# Patient Record
Sex: FEMALE | Race: WHITE | Employment: OTHER | URBAN - METROPOLITAN AREA
[De-identification: names, ages, dates, MRNs, and addresses within clinical notes are randomized per-mention and may not be internally consistent; named-entity substitution may affect disease eponyms.]

---

## 2017-05-23 ENCOUNTER — HOSPITAL ENCOUNTER (OUTPATIENT)
Dept: ULTRASOUND IMAGING | Age: 62
Discharge: HOME | End: 2017-05-23
Attending: NURSE PRACTITIONER
Payer: OTHER GOVERNMENT

## 2017-05-23 DIAGNOSIS — B18.2 CHRONIC HEPATITIS C WITH HEPATIC COMA (HCC): ICD-10-CM

## 2017-05-23 PROCEDURE — 0346T US ABD LTD W ELASTOGRAPHY: CPT

## 2017-05-23 PROCEDURE — 0346T US ELASTOGRAPHY: CPT

## 2019-05-13 PROBLEM — D69.6 THROMBOCYTOPENIA (HCC): Status: ACTIVE | Noted: 2019-05-13

## 2019-05-13 PROBLEM — D89.1 CRYOGLOBULINEMIC VASCULITIS (HCC): Status: ACTIVE | Noted: 2019-05-13

## 2019-05-13 NOTE — PROGRESS NOTES
[place] [id number], [address]  [place], [address]  Office Phone: [phone]  Fax: [phone]      Reason for visit:  No chief complaint on file. HPI:   Ty Gallo is a 66 y.o.  female history of hepatitis C (genotype 1a) was treated with harvoni Who I was asked to see in consultation at the request of Dr. Deirdre Jaffe for evaluation for thrombocytopenia with cryoglobulin vasculitis    DX: thrombocytopenia with cryoglobulinemic vasculitis    Past medical history:HepC treated, cryoglobulinemia  Past surgical history:Knees replacement, TAHBSO, Kidney biopsy  Family history:Not significant  Allergies:NKDA    Social History     Socioeconomic History    Marital status:      Spouse name: Not on file    Number of children: Not on file    Years of education: Not on file    Highest education level: Not on file       Review of Systems  All 12 points of review of system are negative except for tingling in fingers and feet chronically    Objective:  Physical Exam:  Visit Vitals  /75 (BP 1 Location: Right arm, BP Patient Position: Sitting)   Pulse 68   Temp 96.9 °F (36.1 °C) (Oral)   Resp 18   Wt 109.3 kg (241 lb)         General:  Alert, cooperative, no distress, appears stated age. Head:  Normocephalic, without obvious abnormality, atraumatic. Eyes:  Conjunctivae/corneas clear. PERRL, EOMs intact. Throat: Lips, mucosa, and tongue normal.    Neck: Supple, symmetrical, trachea midline, no adenopathy, thyroid: no enlargement/tenderness/nodules   Back:   Symmetric, no curvature. ROM normal. No CVA tenderness. Lungs:   Clear to auscultation bilaterally. Chest wall:  No tenderness or deformity. Heart:  Regular rate and rhythm, S1, S2 normal, no murmur, click, rub or gallop. Abdomen:   Soft, non-tender. Bowel sounds normal. No masses,  No organomegaly.    Extremities: Tips of fingers small wounds from vasculitis   Skin: Skin color, texture, turgor normal. No rashes or lesions. Lymph nodes: Cervical, supraclavicular, and axillary nodes normal.   Neurologic: CNII-XII intact. Diagnostic Imaging     No results found for this or any previous visit. Lab Results  No results found for: WBC, HGB, HGBP, HCT, PHCT, RBCH, PLT, MCV, HGBEXT, HCTEXT, PLTEXT    No results found for: NA, K, CL, CO2, AGAP, GLU, BUN, CREA, BUCR, GFRAA, GFRNA, CA, TBIL, GPT, SGOT, AP, TP, ALB, GLOB, AGRAT, ALT    Assessment/Plan:  66 y.o. female   1. Cryoglobulinemic vasculitis ([facility] [address]  She was given Rituxan by her rheumatologist Tadeo Munguia  Being treated with rituxan by rheumatologist    2. 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 Rituxan. resoved as of 4/30/19. On 04/30/2019, WBC 6.4, H&H 15/45.5, MCV 86, platelet 953. Recheck CBC and CMP    CBC came back and showed WBC 7.4, H&H 15.1/45.3, platelet 712. Patient has mild thrombocytopenia which could be either related to medication or immune thrombocytopenia (ITP). I will order platelet antibodies for next visit however there is no hematologic intervention needed for now unless platelet=<10,000 or patient bleeding. There are many cases of ITP related to cryoglobulinemic vasculitis. 3.  Hepatitis C: Patient says she was treated with Kathye Pass. Recheck hepatitis C serology. Thank you Raquel Villegas for allowing me to participate in Ms. Kraft's care      Return in 2weeks

## 2019-05-14 ENCOUNTER — HOSPITAL ENCOUNTER (OUTPATIENT)
Dept: INFUSION THERAPY | Age: 64
Discharge: HOME | End: 2019-05-14
Payer: OTHER GOVERNMENT

## 2019-05-14 ENCOUNTER — OFFICE VISIT (OUTPATIENT)
Dept: ONCOLOGY | Age: 64
End: 2019-05-14

## 2019-05-14 VITALS
DIASTOLIC BLOOD PRESSURE: 75 MMHG | TEMPERATURE: 96.9 F | RESPIRATION RATE: 18 BRPM | WEIGHT: 241 LBS | HEART RATE: 68 BPM | SYSTOLIC BLOOD PRESSURE: 148 MMHG

## 2019-05-14 VITALS — HEART RATE: 68 BPM | DIASTOLIC BLOOD PRESSURE: 75 MMHG | SYSTOLIC BLOOD PRESSURE: 148 MMHG | TEMPERATURE: 97 F

## 2019-05-14 DIAGNOSIS — D69.6 THROMBOCYTOPENIA (HCC): ICD-10-CM

## 2019-05-14 DIAGNOSIS — D89.1 CRYOGLOBULINEMIC VASCULITIS (HCC): Primary | ICD-10-CM

## 2019-05-14 DIAGNOSIS — D89.1 CRYOGLOBULINEMIC VASCULITIS (HCC): ICD-10-CM

## 2019-05-14 LAB
ALBUMIN SERPL-MCNC: 3.7 G/DL (ref 3.4–5)
ALBUMIN/GLOB SERPL: 0.9 {RATIO} (ref 0.8–1.7)
ALP SERPL-CCNC: 46 U/L (ref 45–117)
ALT SERPL-CCNC: 29 U/L (ref 13–56)
ANION GAP SERPL CALC-SCNC: 8 MMOL/L (ref 3–18)
AST SERPL-CCNC: 21 U/L (ref 15–37)
BASO+EOS+MONOS # BLD AUTO: 0.7 K/UL (ref 0–2.3)
BASO+EOS+MONOS NFR BLD AUTO: 10 % (ref 0.1–17)
BILIRUB SERPL-MCNC: 0.5 MG/DL (ref 0.2–1)
BUN SERPL-MCNC: 20 MG/DL (ref 7–18)
BUN/CREAT SERPL: 17 (ref 12–20)
CALCIUM SERPL-MCNC: 9 MG/DL (ref 8.5–10.1)
CHLORIDE SERPL-SCNC: 106 MMOL/L (ref 100–108)
CO2 SERPL-SCNC: 25 MMOL/L (ref 21–32)
CREAT SERPL-MCNC: 1.17 MG/DL (ref 0.6–1.3)
DIFFERENTIAL METHOD BLD: ABNORMAL
ERYTHROCYTE [DISTWIDTH] IN BLOOD BY AUTOMATED COUNT: 15.6 % (ref 11.5–14.5)
GLOBULIN SER CALC-MCNC: 3.9 G/DL (ref 2–4)
GLUCOSE SERPL-MCNC: 118 MG/DL (ref 74–99)
HCT VFR BLD AUTO: 45.3 % (ref 36–48)
HGB BLD-MCNC: 15.1 G/DL (ref 12–16)
LYMPHOCYTES # BLD: 0.7 K/UL (ref 1.1–5.9)
LYMPHOCYTES NFR BLD: 10 % (ref 14–44)
MCH RBC QN AUTO: 29 PG (ref 25–35)
MCHC RBC AUTO-ENTMCNC: 33.3 G/DL (ref 31–37)
MCV RBC AUTO: 86.9 FL (ref 78–102)
NEUTS SEG # BLD: 6 K/UL (ref 1.8–9.5)
NEUTS SEG NFR BLD: 81 % (ref 40–70)
PLATELET # BLD AUTO: 129 K/UL (ref 140–440)
POTASSIUM SERPL-SCNC: 4.1 MMOL/L (ref 3.5–5.5)
PROT SERPL-MCNC: 7.6 G/DL (ref 6.4–8.2)
RBC # BLD AUTO: 5.21 M/UL (ref 4.1–5.1)
SODIUM SERPL-SCNC: 139 MMOL/L (ref 136–145)
WBC # BLD AUTO: 7.4 K/UL (ref 4.5–13)

## 2019-05-14 PROCEDURE — 85025 COMPLETE CBC W/AUTO DIFF WBC: CPT

## 2019-05-14 PROCEDURE — 80053 COMPREHEN METABOLIC PANEL: CPT

## 2019-05-14 PROCEDURE — 36415 COLL VENOUS BLD VENIPUNCTURE: CPT

## 2019-05-14 RX ORDER — HYDROCODONE BITARTRATE AND ACETAMINOPHEN 5; 325 MG/1; MG/1
TABLET ORAL
Refills: 0 | COMMUNITY
Start: 2019-04-24

## 2019-05-14 RX ORDER — ERGOCALCIFEROL 1.25 MG/1
CAPSULE ORAL
COMMUNITY
Start: 2019-03-08

## 2019-05-14 RX ORDER — SILDENAFIL CITRATE 20 MG/1
TABLET ORAL
COMMUNITY
Start: 2019-03-05 | End: 2019-12-27

## 2019-05-14 RX ORDER — PREDNISONE 5 MG/1
TABLET ORAL
COMMUNITY
Start: 2019-05-13 | End: 2019-12-27

## 2019-05-14 RX ORDER — IPRATROPIUM BROMIDE AND ALBUTEROL 20; 100 UG/1; UG/1
SPRAY, METERED RESPIRATORY (INHALATION)
COMMUNITY
Start: 2019-03-24

## 2019-05-14 RX ORDER — NITROFURANTOIN 25; 75 MG/1; MG/1
CAPSULE ORAL
Refills: 0 | COMMUNITY
Start: 2019-04-09 | End: 2019-08-29 | Stop reason: ALTCHOICE

## 2019-05-14 RX ORDER — SULFAMETHOXAZOLE AND TRIMETHOPRIM 800; 160 MG/1; MG/1
TABLET ORAL
Refills: 0 | COMMUNITY
Start: 2019-05-01 | End: 2019-05-29 | Stop reason: ALTCHOICE

## 2019-05-14 RX ORDER — SERTRALINE HYDROCHLORIDE 50 MG/1
100 TABLET, FILM COATED ORAL DAILY
COMMUNITY
Start: 2019-05-11 | End: 2019-12-27

## 2019-05-14 RX ORDER — AMLODIPINE BESYLATE 10 MG/1
TABLET ORAL
COMMUNITY
Start: 2019-02-09

## 2019-05-14 RX ORDER — TRAZODONE HYDROCHLORIDE 50 MG/1
TABLET ORAL
COMMUNITY
Start: 2019-04-04

## 2019-05-14 RX ORDER — DIPHENOXYLATE HYDROCHLORIDE AND ATROPINE SULFATE 2.5; .025 MG/1; MG/1
TABLET ORAL
Refills: 0 | COMMUNITY
Start: 2019-04-29

## 2019-05-14 RX ORDER — CLINDAMYCIN HYDROCHLORIDE 150 MG/1
CAPSULE ORAL
Refills: 0 | COMMUNITY
Start: 2019-04-10 | End: 2019-05-29 | Stop reason: ALTCHOICE

## 2019-05-14 RX ORDER — NIFEDIPINE 60 MG/1
TABLET, EXTENDED RELEASE ORAL
COMMUNITY
Start: 2019-03-05

## 2019-05-14 RX ORDER — PANTOPRAZOLE SODIUM 40 MG/1
TABLET, DELAYED RELEASE ORAL
COMMUNITY
Start: 2019-02-17

## 2019-05-14 NOTE — Clinical Note
Please call patient and let her know that her platelets are 482. Only mildly decreased. 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 No intervention needed at this point. Call us if any non stopping nose bleeding, BRBPR or melena or sudden large bruises. Thx

## 2019-05-29 ENCOUNTER — OFFICE VISIT (OUTPATIENT)
Dept: ONCOLOGY | Age: 64
End: 2019-05-29

## 2019-05-29 VITALS
OXYGEN SATURATION: 95 % | DIASTOLIC BLOOD PRESSURE: 79 MMHG | SYSTOLIC BLOOD PRESSURE: 128 MMHG | HEART RATE: 71 BPM | TEMPERATURE: 96.8 F | RESPIRATION RATE: 18 BRPM | WEIGHT: 244 LBS

## 2019-05-29 DIAGNOSIS — D69.6 THROMBOCYTOPENIA (HCC): Primary | ICD-10-CM

## 2019-05-29 NOTE — PROGRESS NOTES
Charlie Wong is a 60 y.o. female presenting today for a follow-up r/t thrombocytopenia. Patient is ambulatory with no assistive devices and complains of pain in lower back and index finger left hand 7/10. Chief Complaint   Patient presents with    Thrombocytopenia     follow up     Visit Vitals  /79 (BP 1 Location: Right arm, BP Patient Position: Sitting)   Pulse 71   Temp 96.8 °F (36 °C) (Oral)   Resp 18   Wt 110.7 kg (244 lb)   SpO2 95%       Current Outpatient Medications   Medication Sig    diphenoxylate-atropine (LOMOTIL) 2.5-0.025 mg per tablet     ergocalciferol (ERGOCALCIFEROL) 50,000 unit capsule     NIFEdipine ER (ADALAT CC) 60 mg ER tablet     pantoprazole (PROTONIX) 40 mg tablet     predniSONE (DELTASONE) 5 mg tablet     sertraline (ZOLOFT) 50 mg tablet Take 100 mg by mouth daily.  traZODone (DESYREL) 50 mg tablet     Cetirizine (ZYRTEC) 10 mg cap Take  by mouth.  amLODIPine (NORVASC) 10 mg tablet     HYDROcodone-acetaminophen (NORCO) 5-325 mg per tablet take 1 to 2 tablets by mouth every 6 hours if needed for pain    COMBIVENT RESPIMAT  mcg/actuation inhaler     nitrofurantoin, macrocrystal-monohydrate, (MACROBID) 100 mg capsule take 1 capsule by mouth twice a day with food for 7 days    sildenafil, antihypertensive, (REVATIO) 20 mg tablet      No current facility-administered medications for this visit. Medications no longer taking/discontinued: zyrtec, norco, macrobid, revatio    Fall Risk Assessment, last 12 mths 5/29/2019   Able to walk? Yes   Fall in past 12 months? No       3 most recent PHQ Screens 5/29/2019   Little interest or pleasure in doing things Several days   Feeling down, depressed, irritable, or hopeless Several days   Total Score PHQ 2 2         1. Have you been to the ER, urgent care clinic since your last visit? Hospitalized since your last visit?no    2.  Have you seen or consulted any other health care providers outside of the [facility] since your last visit? Include any pap smears or colon screening.  no

## 2019-05-29 NOTE — PROGRESS NOTES
Hematology/Oncology  Progress Note    Name: Girish Silverio  Date: 2019  : 1955    Christiano Tamez MD     Ms. Cynthia Hollis is a 60y.o. year old female who was seen for Thrombocytopenia. Subjective:     Ms. Cynthia Hollis is a 60y.o. year old female with a past medical history of Hep C, cryoglobulinemic vasculitis, and thrombocytopenia. She is currently under the care of a rheumatologist for her cryoglobulinemia, she is being treated with Rituxan She is here today for follow up. She denies unexplained bruising/bleeding. Today she does not have any new physical concerns or complaints to report. Past medical history, family history, and social history: these were reviewed and remains unchanged. History reviewed. No pertinent past medical history. History reviewed. No pertinent surgical history.   Social History     Socioeconomic History    Marital status:      Spouse name: Not on file    Number of children: Not on file    Years of education: Not on file    Highest education level: Not on file   Occupational History    Not on file   Social Needs    Financial resource strain: Not on file    Food insecurity:     Worry: Not on file     Inability: Not on file    Transportation needs:     Medical: Not on file     Non-medical: Not on file   Tobacco Use    Smoking status: Never Smoker    Smokeless tobacco: Never Used   Substance and Sexual Activity    Alcohol use: Never     Frequency: Never    Drug use: Not on file    Sexual activity: Not on file   Lifestyle    Physical activity:     Days per week: Not on file     Minutes per session: Not on file    Stress: Not on file   Relationships    Social connections:     Talks on phone: Not on file     Gets together: Not on file     Attends [employer] service: Not on file     Active member of club or organization: Not on file     Attends meetings of clubs or organizations: Not on file     Relationship status: Not on file    Intimate partner violence:     Fear of current or ex partner: Not on file     Emotionally abused: Not on file     Physically abused: Not on file     Forced sexual activity: Not on file   Other Topics Concern    Not on file   Social History Narrative    Not on file     History reviewed. No pertinent family history. Current Outpatient Medications   Medication Sig Dispense Refill    diphenoxylate-atropine (LOMOTIL) 2.5-0.025 mg per tablet   0    ergocalciferol (ERGOCALCIFEROL) 50,000 unit capsule       NIFEdipine ER (ADALAT CC) 60 mg ER tablet       pantoprazole (PROTONIX) 40 mg tablet       predniSONE (DELTASONE) 5 mg tablet       sertraline (ZOLOFT) 50 mg tablet Take 100 mg by mouth daily.  traZODone (DESYREL) 50 mg tablet       Cetirizine (ZYRTEC) 10 mg cap Take  by mouth.  amLODIPine (NORVASC) 10 mg tablet       HYDROcodone-acetaminophen (NORCO) 5-325 mg per tablet take 1 to 2 tablets by mouth every 6 hours if needed for pain  0    COMBIVENT RESPIMAT  mcg/actuation inhaler       nitrofurantoin, macrocrystal-monohydrate, (MACROBID) 100 mg capsule take 1 capsule by mouth twice a day with food for 7 days  0    sildenafil, antihypertensive, (REVATIO) 20 mg tablet          Review of Systems  Constitutional: The patient has no acute distress or discomfort. HEENT: The patient denies recent head trauma, eye pain, blurred vision,  hearing deficit, oropharyngeal mucosal pain or lesions, and the patient denies throat pain or discomfort. Lymphatics: The patient denies palpable peripheral lymphadenopathy. Hematologic: The patient denies having bruising, bleeding, or progressive fatigue. Respiratory: Patient denies having shortness of breath, cough, sputum production, fever, or dyspnea on exertion. Cardiovascular: The patient denies having leg pain, leg swelling, heart palpitations, chest permit, chest pain, or lightheadedness. The patient denies having dyspnea on exertion.   Gastrointestinal: The patient denies having nausea, emesis, or diarrhea. The patient denies having any hematemesis or blood in the stool. Genitourinary: Patient denies having urinary urgency, frequency, or dysuria. The patient denies having blood in the urine. Psychological: The patient denies having symptoms of nervousness, anxiety, depression, or thoughts of harming self. Skin: Patient denies having skin rashes, skin, ulcerations, or unexplained itching or pruritus. Musculoskeletal: The patient denies having pain in the joints or bones. Objective:     Visit Vitals  Blood Pressure 128/79 (BP 1 Location: Right arm, BP Patient Position: Sitting)   Pulse 71   Temperature 96.8 °F (36 °C) (Oral)   Respiration 18   Weight 110.7 kg (244 lb)   Oxygen Saturation 95%      PS 0/10  Physical Exam:     Constitutional Alert, cooperative, oriented. Mood and affect appropriate. Well nourished. Well developed. Head Normocephalic; no scars   Eyes Conjunctivae and sclerae are clear and without icterus. Pupils are reactive and equal.   ENMT Sinuses are nontender. No oral exudates, ulcers, masses, thrush or mucositis. Oropharynx clear. Tongue normal.   Neck Supple without masses or thyromegaly. No jugular venous distension. Hematologic/Lymphatic No petechiae or purpura. No tender or palpable lymph nodes in the cervical, supraclavicular, axillary or inguinal area. Respiratory Lungs are clear to auscultation without rhonchi or wheezing. Cardiovascular Regular rate and rhythm of heart without murmurs, gallops or rubs. Chest / Line Site Chest is symmetric with no chest wall deformities. Abdomen Non-tender, non-distended, no masses, ascites or hepatosplenomegaly. Good bowel sounds. No guarding or rebound tenderness. No pulsatile masses. Musculoskeletal No tenderness or swelling, normal range of motion without obvious weakness. Extremities No visible deformities, no cyanosis, clubbing or edema.     Skin No rashes, scars, or lesions suggestive of malignancy. No petechiae, purpura, or ecchymoses. No excoriations. Neurologic No sensory or motor deficits, normal cerebellar function, normal gait, cranial nerves intact. Psychiatric Alert and oriented times three. Coherent speech. There is no clinical evidence of anxiety, depression, or melancholy. Verbalizes understanding of our discussions today. Lab data:      Results for orders placed or performed during the hospital encounter of 05/14/19   CBC WITH 3 PART DIFF     Status: Abnormal   Result Value Ref Range Status    WBC 7.4 4.5 - 13.0 K/uL Final    RBC 5.21 (H) 4.10 - 5.10 M/uL Final    HGB 15.1 12.0 - 16.0 g/dL Final    HCT 45.3 36 - 48 % Final    MCV 86.9 78 - 102 FL Final    MCH 29.0 25.0 - 35.0 PG Final    MCHC 33.3 31 - 37 g/dL Final    RDW 15.6 (H) 11.5 - 14.5 % Final    PLATELET 098 (L) 357 - 440 K/uL Final    NEUTROPHILS 81 (H) 40 - 70 % Final    MIXED CELLS 10 0.1 - 17 % Final    LYMPHOCYTES 10 (L) 14 - 44 % Final    ABS. NEUTROPHILS 6.0 1.8 - 9.5 K/UL Final    ABS. MIXED CELLS 0.7 0.0 - 2.3 K/uL Final    ABS. LYMPHOCYTES 0.7 (L) 1.1 - 5.9 K/UL Final     Comment: Test performed at [address] [place] [address] or Outpatient [facility]fusion [facility] Location. Reviewed by Medical Director. DF AUTOMATED   Final         Assessment / Plan     1. Thrombocytopenia ([address]      I have explained to the patient that her thrombocytopenia is mild. No interventions are warranted at this time. The CBC from 5/14/2019 showed a platelet count of 467,127. Platelet AB was ordered, but not done. Patient will repeat labs; CBC, CMP, and platelet AB prior to f/u visit in 3 months. We will monitor CBC every 3 months, if the platelets fall below 10,000 the patient will be treated with IVIG or high dose steroid.           Orders Placed This Encounter    CBC WITH AUTOMATED DIFF     Standing Status:   Future     Standing Expiration Date:   5/69/8347    METABOLIC PANEL, COMPREHENSIVE     Standing Status: Future     Standing Expiration Date:   5/29/2020    PLATELET AB, DIRECT     Standing Status:   Future     Standing Expiration Date:   5/29/2020     F/U 3 months or sooner if indicated.      Nadia Castillo NP  5/29/2019

## 2019-08-26 ENCOUNTER — HOSPITAL ENCOUNTER (OUTPATIENT)
Dept: INFUSION THERAPY | Age: 64
Discharge: HOME | End: 2019-08-26
Payer: OTHER GOVERNMENT

## 2019-08-26 VITALS
HEART RATE: 66 BPM | SYSTOLIC BLOOD PRESSURE: 148 MMHG | DIASTOLIC BLOOD PRESSURE: 70 MMHG | TEMPERATURE: 97 F | OXYGEN SATURATION: 98 %

## 2019-08-26 DIAGNOSIS — D69.6 THROMBOCYTOPENIA (HCC): ICD-10-CM

## 2019-08-26 LAB
ALBUMIN SERPL-MCNC: 3.5 G/DL (ref 3.4–5)
ALBUMIN/GLOB SERPL: 0.9 {RATIO} (ref 0.8–1.7)
ALP SERPL-CCNC: 70 U/L (ref 45–117)
ALT SERPL-CCNC: 23 U/L (ref 13–56)
ANION GAP SERPL CALC-SCNC: 4 MMOL/L (ref 3–18)
AST SERPL-CCNC: 22 U/L (ref 10–38)
BASOPHILS # BLD: 0 K/UL (ref 0–0.1)
BASOPHILS NFR BLD: 1 % (ref 0–2)
BILIRUB SERPL-MCNC: 0.5 MG/DL (ref 0.2–1)
BUN SERPL-MCNC: 18 MG/DL (ref 7–18)
BUN/CREAT SERPL: 17 (ref 12–20)
CALCIUM SERPL-MCNC: 8.8 MG/DL (ref 8.5–10.1)
CHLORIDE SERPL-SCNC: 104 MMOL/L (ref 100–111)
CO2 SERPL-SCNC: 28 MMOL/L (ref 21–32)
CREAT SERPL-MCNC: 1.05 MG/DL (ref 0.6–1.3)
DIFFERENTIAL METHOD BLD: ABNORMAL
EOSINOPHIL # BLD: 0.1 K/UL (ref 0–0.4)
EOSINOPHIL NFR BLD: 2 % (ref 0–5)
ERYTHROCYTE [DISTWIDTH] IN BLOOD BY AUTOMATED COUNT: 14.8 % (ref 11.6–14.5)
GLOBULIN SER CALC-MCNC: 4 G/DL (ref 2–4)
GLUCOSE SERPL-MCNC: 89 MG/DL (ref 74–99)
HCT VFR BLD AUTO: 41.5 % (ref 35–45)
HGB BLD-MCNC: 13.7 G/DL (ref 12–16)
LYMPHOCYTES # BLD: 0.6 K/UL (ref 0.9–3.6)
LYMPHOCYTES NFR BLD: 14 % (ref 21–52)
MCH RBC QN AUTO: 27 PG (ref 24–34)
MCHC RBC AUTO-ENTMCNC: 33 G/DL (ref 31–37)
MCV RBC AUTO: 81.9 FL (ref 74–97)
MONOCYTES # BLD: 0.4 K/UL (ref 0.05–1.2)
MONOCYTES NFR BLD: 9 % (ref 3–10)
NEUTS SEG # BLD: 3.1 K/UL (ref 1.8–8)
NEUTS SEG NFR BLD: 74 % (ref 40–73)
PLATELET # BLD AUTO: 120 K/UL (ref 135–420)
PMV BLD AUTO: 11.3 FL (ref 9.2–11.8)
POTASSIUM SERPL-SCNC: 4.3 MMOL/L (ref 3.5–5.5)
PROT SERPL-MCNC: 7.5 G/DL (ref 6.4–8.2)
RBC # BLD AUTO: 5.07 M/UL (ref 4.2–5.3)
SODIUM SERPL-SCNC: 136 MMOL/L (ref 136–145)
WBC # BLD AUTO: 4.2 K/UL (ref 4.6–13.2)

## 2019-08-26 PROCEDURE — 36415 COLL VENOUS BLD VENIPUNCTURE: CPT

## 2019-08-26 PROCEDURE — 85025 COMPLETE CBC W/AUTO DIFF WBC: CPT

## 2019-08-26 PROCEDURE — 86022 PLATELET ANTIBODIES: CPT

## 2019-08-26 PROCEDURE — 80053 COMPREHEN METABOLIC PANEL: CPT

## 2019-08-26 NOTE — PROGRESS NOTES
[facility] Progress Note    Date: 2019    Name: Emre Bautista    MRN: [id number]         : 1955    Peripheral Lab Draw      Ms. Krfat to [facility], ambulatory at [address] [place] [address] accompanied by self. Pt was assessed and education was provided. Ms. Kraft's vitals were reviewed and patient was observed for 5 minutes prior to treatment. Visit Vitals  /70 (BP 1 Location: Right arm, BP Patient Position: Sitting)   Pulse 66   Temp 97 °F (36.1 °C) (Oral)   SpO2 98%     Recent Results (from the past 12 hour(s))   METABOLIC PANEL, COMPREHENSIVE    Collection Time: 19 11:42 AM   Result Value Ref Range    Sodium 136 136 - 145 mmol/L    Potassium 4.3 3.5 - 5.5 mmol/L    Chloride 104 100 - 111 mmol/L    CO2 28 21 - 32 mmol/L    Anion gap 4 3.0 - 18 mmol/L    Glucose 89 74 - 99 mg/dL    BUN 18 7.0 - 18 MG/DL    Creatinine 1.05 0.6 - 1.3 MG/DL    BUN/Creatinine ratio 17 12 - 20      GFR est AA >60 >60 ml/min/1.73m2    GFR est non-AA 53 (L) >60 ml/min/1.73m2    Calcium 8.8 8.5 - 10.1 MG/DL    Bilirubin, total 0.5 0.2 - 1.0 MG/DL    ALT (SGPT) 23 13 - 56 U/L    AST (SGOT) 22 10 - 38 U/L    Alk. phosphatase 70 45 - 117 U/L    Protein, total 7.5 6.4 - 8.2 g/dL    Albumin 3.5 3.4 - 5.0 g/dL    Globulin 4.0 2.0 - 4.0 g/dL    A-G Ratio 0.9 0.8 - 1.7     CBC WITH AUTOMATED DIFF    Collection Time: 19 11:42 AM   Result Value Ref Range    WBC 4.2 (L) 4.6 - 13.2 K/uL    RBC 5.07 4.20 - 5.30 M/uL    HGB 13.7 12.0 - 16.0 g/dL    HCT 41.5 35.0 - 45.0 %    MCV 81.9 74.0 - 97.0 FL    MCH 27.0 24.0 - 34.0 PG    MCHC 33.0 31.0 - 37.0 g/dL    RDW 14.8 (H) 11.6 - 14.5 %    PLATELET 753 (L) 382 - 420 K/uL    MPV 11.3 9.2 - 11.8 FL    NEUTROPHILS 74 (H) 40 - 73 %    LYMPHOCYTES 14 (L) 21 - 52 %    MONOCYTES 9 3 - 10 %    EOSINOPHILS 2 0 - 5 %    BASOPHILS 1 0 - 2 %    ABS. NEUTROPHILS 3.1 1.8 - 8.0 K/UL    ABS. LYMPHOCYTES 0.6 (L) 0.9 - 3.6 K/UL    ABS. MONOCYTES 0.4 0.05 - 1.2 K/UL    ABS. EOSINOPHILS 0.1 0.0 - 0.4 K/UL    ABS. BASOPHILS 0.0 0.0 - 0.1 K/UL    DF AUTOMATED         Blood obtained peripherally from lft arm x 1 attempt with butterfly needle and sent to lab for Cbc w/diff, Platelet AB Direct and Cmp per written orders. No bleeding or hematoma noted at site. Guaze and coban applied. Ms. Altagracia Haines tolerated the phlebotomy, and had no complaints. Patient armband removed and shredded. Ms. Altagracia Haines was discharged from [place] [address] in stable condition at 1145.      Tyrell Marquez Phlebotomist PCT  August 26, 2019  4:27 PM

## 2019-08-28 LAB
PLAT GP IA/IIA IGG BLD QL IA: POSITIVE
PLAT GP IB/IX IGG BLD QL IA: POSITIVE
PLAT GP IIB/IIIA IGG BLD QL IA: POSITIVE

## 2019-08-29 ENCOUNTER — OFFICE VISIT (OUTPATIENT)
Dept: ONCOLOGY | Age: 64
End: 2019-08-29

## 2019-08-29 VITALS
TEMPERATURE: 96.8 F | WEIGHT: 243.4 LBS | SYSTOLIC BLOOD PRESSURE: 177 MMHG | OXYGEN SATURATION: 89 % | HEART RATE: 94 BPM | DIASTOLIC BLOOD PRESSURE: 81 MMHG | RESPIRATION RATE: 18 BRPM

## 2019-08-29 DIAGNOSIS — D69.6 THROMBOCYTOPENIA (HCC): Primary | ICD-10-CM

## 2019-08-29 DIAGNOSIS — R53.83 FATIGUE, UNSPECIFIED TYPE: ICD-10-CM

## 2019-08-29 RX ORDER — BUPROPION HYDROCHLORIDE 300 MG/1
TABLET ORAL
COMMUNITY
Start: 2019-08-12

## 2019-08-29 NOTE — PROGRESS NOTES
Dru Chew is a 60 y.o. 1955 female and presents with Follow-up (Thrombocytopenia and cryoglobulinemia)    Name: Dru Chew  Date: 2019  : 1955     Naman Monsalve MD      Ms. Renae Deng is a 60y.o. year old female who was seen for Thrombocytopenia.        Subjective:      Ms. Renae Deng is a 60y.o. year old female with a past medical history of Hep C, cryoglobulinemic vasculitis, and thrombocytopenia. She is currently under the care of a rheumatologist for her cryoglobulinemia, she was treated with Rituxan She is here today for follow up. She denies unexplained bruising/bleeding. Labs on 2019 showed WBC 4.2, H&H 13.7/41.5, platelet 033. Patient had positive antibody with platelet associated anti-IIb/IIIa, positive anti-Ib/IX, and anti-Ia/IIa. BUN and creatinine normal at 18 and 1.05. On review of systems today she complains of 10/10 chronic back pain, and easy fatigue, occasional nausea as well as diarrhea since she started Wellbutrin. Denies any fevers, chills, shortness of breath, abdominal pain. No melena or bright red blood per rectum. No focal neurologic deficit. ECOG performance status 0. Independent with ADLs and IADLs.    Past medical history, family history, and social history: these were reviewed and remains unchanged. Social History     Socioeconomic History    Marital status:      Spouse name: Not on file    Number of children: Not on file    Years of education: Not on file    Highest education level: Not on file   Tobacco Use    Smoking status: Never Smoker    Smokeless tobacco: Never Used   Substance and Sexual Activity    Alcohol use: Never     Frequency: Never    Drug use: Never    Sexual activity: Not Currently     History reviewed. No pertinent family history.     Current Outpatient Medications   Medication Sig Dispense Refill    buPROPion XL (WELLBUTRIN XL) 300 mg XL tablet       amLODIPine (NORVASC) 10 mg tablet       diphenoxylate-atropine (LOMOTIL) 2.5-0.025 mg per tablet   0    ergocalciferol (ERGOCALCIFEROL) 50,000 unit capsule       COMBIVENT RESPIMAT  mcg/actuation inhaler       NIFEdipine ER (ADALAT CC) 60 mg ER tablet       pantoprazole (PROTONIX) 40 mg tablet       predniSONE (DELTASONE) 5 mg tablet       traZODone (DESYREL) 50 mg tablet       HYDROcodone-acetaminophen (NORCO) 5-325 mg per tablet take 1 to 2 tablets by mouth every 6 hours if needed for pain  0    sertraline (ZOLOFT) 50 mg tablet Take 100 mg by mouth daily.  sildenafil, antihypertensive, (REVATIO) 20 mg tablet          No Known Allergies    Review of Systems    A 12 points  comprehensive review of systems was negative except for: chronic back pain, nausea diarrhea. Objective:  Visit Vitals  /81   Pulse 94   Temp 96.8 °F (36 °C) (Oral)   Resp 18   Wt 110.4 kg (243 lb 6.4 oz)   SpO2 (!) 89%         Physical Exam:   General appearance - alert, well appearing, and in no distress  Mental status - alert, oriented to person, place, and time  EYE-LANDY, EOMI  ENT-ENT exam normal, no neck nodes or sinus tenderness  Mouth - mucous membranes moist, pharynx normal without lesions  Neck - supple, no significant adenopathy   Chest - clear to auscultation, no wheezes, rales or rhonchi, symmetric air entry   Heart - normal rate and regular rhythm   Abdomen - soft, nontender, nondistended, no masses or organomegaly  Lymph- no adenopathy palpable  Ext-no pedal edema noted  Skin-Warm and dry. Neuro -alert, oriented, normal speech, no focal findings or movement disorder noted      Diagnostic Imaging     No results found for this or any previous visit. No results found for this or any previous visit. No results found for this or any previous visit.     Lab Results  Lab Results   Component Value Date/Time    WBC 4.2 (L) 08/26/2019 11:42 AM    HGB 13.7 08/26/2019 11:42 AM    HCT 41.5 08/26/2019 11:42 AM    PLATELET 670 (L) 24/39/6802 11:42 AM MCV 81.9 08/26/2019 11:42 AM       Lab Results   Component Value Date/Time    Sodium 136 08/26/2019 11:42 AM    Potassium 4.3 08/26/2019 11:42 AM    Chloride 104 08/26/2019 11:42 AM    CO2 28 08/26/2019 11:42 AM    Anion gap 4 08/26/2019 11:42 AM    Glucose 89 08/26/2019 11:42 AM    BUN 18 08/26/2019 11:42 AM    Creatinine 1.05 08/26/2019 11:42 AM    BUN/Creatinine ratio 17 08/26/2019 11:42 AM    GFR est AA >60 08/26/2019 11:42 AM    GFR est non-AA 53 (L) 08/26/2019 11:42 AM    Calcium 8.8 08/26/2019 11:42 AM    AST (SGOT) 22 08/26/2019 11:42 AM    Alk. phosphatase 70 08/26/2019 11:42 AM    Protein, total 7.5 08/26/2019 11:42 AM    Albumin 3.5 08/26/2019 11:42 AM    Globulin 4.0 08/26/2019 11:42 AM    A-G Ratio 0.9 08/26/2019 11:42 AM    ALT (SGPT) 23 08/26/2019 11:42 AM       Assessment/Plan:  1. Thrombocytopenia ([address]       I have explained to the patient that her thrombocytopenia is mild. No interventions are warranted at this time. She has ITP with positive anti platelets antibodies. We will monitor CBC every 3 months, if the platelets fall below 10,000 the patient will be treated with IVIG or high dose steroid. Patient will repeat labs; CBC, CMP, and platelet AB prior to f/u visit in 3 months.     2. Cryoglobulinemia: F/u  with rheumatology    RTC 3 months  Sanjeev Mix MD

## 2019-08-29 NOTE — PROGRESS NOTES
Bianka العلي is a 60 y.o. female presenting today for a follow-up appointment. Patient is ambulatory with no assistive devices and 10/10 back pain, nausea, and diarrhea. Chief Complaint   Patient presents with    Follow-up     Thrombocytopenia and cryoglobulinemia       Visit Vitals  /81   Pulse 94   Temp 96.8 °F (36 °C) (Oral)   Resp 18   Wt 243 lb 6.4 oz (110.4 kg)   SpO2 (!) 89%       Current Outpatient Medications   Medication Sig    buPROPion XL (WELLBUTRIN XL) 300 mg XL tablet     amLODIPine (NORVASC) 10 mg tablet     diphenoxylate-atropine (LOMOTIL) 2.5-0.025 mg per tablet     ergocalciferol (ERGOCALCIFEROL) 50,000 unit capsule     COMBIVENT RESPIMAT  mcg/actuation inhaler     NIFEdipine ER (ADALAT CC) 60 mg ER tablet     pantoprazole (PROTONIX) 40 mg tablet     predniSONE (DELTASONE) 5 mg tablet     traZODone (DESYREL) 50 mg tablet     HYDROcodone-acetaminophen (NORCO) 5-325 mg per tablet take 1 to 2 tablets by mouth every 6 hours if needed for pain    sertraline (ZOLOFT) 50 mg tablet Take 100 mg by mouth daily.  sildenafil, antihypertensive, (REVATIO) 20 mg tablet      No current facility-administered medications for this visit. Medications no longer taking/discontinued: Norco, Zoloft, Revatio    Fall Risk Assessment, last 12 mths 5/29/2019   Able to walk? Yes   Fall in past 12 months? No       3 most recent PHQ Screens 5/29/2019   Little interest or pleasure in doing things Several days   Feeling down, depressed, irritable, or hopeless Several days   Total Score PHQ 2 2       No flowsheet data found. 1. Have you been to the ER, urgent care clinic since your last visit? Hospitalized since your last visit? No    2. Have you seen or consulted any other health care providers outside of the [address] [place] [address] since your last visit? Include any pap smears or colon screening.  No

## 2019-12-10 ENCOUNTER — HOSPITAL ENCOUNTER (OUTPATIENT)
Dept: INFUSION THERAPY | Age: 64
Discharge: HOME | End: 2019-12-10
Payer: OTHER GOVERNMENT

## 2019-12-10 ENCOUNTER — OFFICE VISIT (OUTPATIENT)
Dept: ONCOLOGY | Age: 64
End: 2019-12-10

## 2019-12-10 VITALS
SYSTOLIC BLOOD PRESSURE: 147 MMHG | TEMPERATURE: 97.2 F | OXYGEN SATURATION: 97 % | BODY MASS INDEX: 36.41 KG/M2 | DIASTOLIC BLOOD PRESSURE: 71 MMHG | HEART RATE: 66 BPM | RESPIRATION RATE: 18 BRPM | HEIGHT: 67 IN | WEIGHT: 232 LBS

## 2019-12-10 DIAGNOSIS — D89.1 CRYOGLOBULINEMIA (HCC): ICD-10-CM

## 2019-12-10 DIAGNOSIS — D69.6 THROMBOCYTOPENIA (HCC): Primary | ICD-10-CM

## 2019-12-10 DIAGNOSIS — R53.83 FATIGUE, UNSPECIFIED TYPE: ICD-10-CM

## 2019-12-10 DIAGNOSIS — E66.01 SEVERE OBESITY (HCC): ICD-10-CM

## 2019-12-10 DIAGNOSIS — D69.6 THROMBOCYTOPENIA (HCC): ICD-10-CM

## 2019-12-10 LAB
ALBUMIN SERPL-MCNC: 3.6 G/DL (ref 3.4–5)
ALBUMIN/GLOB SERPL: 0.9 {RATIO} (ref 0.8–1.7)
ALP SERPL-CCNC: 79 U/L (ref 45–117)
ALT SERPL-CCNC: 21 U/L (ref 13–56)
ANION GAP SERPL CALC-SCNC: 5 MMOL/L (ref 3–18)
AST SERPL-CCNC: 19 U/L (ref 10–38)
BASO+EOS+MONOS # BLD AUTO: 0.5 K/UL (ref 0–2.3)
BASO+EOS+MONOS NFR BLD AUTO: 13 % (ref 0.1–17)
BILIRUB SERPL-MCNC: 0.5 MG/DL (ref 0.2–1)
BUN SERPL-MCNC: 14 MG/DL (ref 7–18)
BUN/CREAT SERPL: 13 (ref 12–20)
CALCIUM SERPL-MCNC: 9.1 MG/DL (ref 8.5–10.1)
CHLORIDE SERPL-SCNC: 106 MMOL/L (ref 100–111)
CO2 SERPL-SCNC: 28 MMOL/L (ref 21–32)
CREAT SERPL-MCNC: 1.05 MG/DL (ref 0.6–1.3)
DIFFERENTIAL METHOD BLD: ABNORMAL
ERYTHROCYTE [DISTWIDTH] IN BLOOD BY AUTOMATED COUNT: 15.7 % (ref 11.5–14.5)
FERRITIN SERPL-MCNC: 17 NG/ML (ref 8–388)
FOLATE SERPL-MCNC: 8.4 NG/ML (ref 3.1–17.5)
GLOBULIN SER CALC-MCNC: 4.1 G/DL (ref 2–4)
GLUCOSE SERPL-MCNC: 77 MG/DL (ref 74–99)
HCT VFR BLD AUTO: 41.6 % (ref 36–48)
HGB BLD-MCNC: 13.6 G/DL (ref 12–16)
IRON SATN MFR SERPL: 14 %
IRON SERPL-MCNC: 46 UG/DL (ref 50–175)
LYMPHOCYTES # BLD: 1 K/UL (ref 1.1–5.9)
LYMPHOCYTES NFR BLD: 24 % (ref 14–44)
MCH RBC QN AUTO: 27.4 PG (ref 25–35)
MCHC RBC AUTO-ENTMCNC: 32.7 G/DL (ref 31–37)
MCV RBC AUTO: 83.9 FL (ref 78–102)
NEUTS SEG # BLD: 2.5 K/UL (ref 1.8–9.5)
NEUTS SEG NFR BLD: 63 % (ref 40–70)
PLATELET # BLD AUTO: 132 K/UL (ref 140–440)
POTASSIUM SERPL-SCNC: 4.2 MMOL/L (ref 3.5–5.5)
PROT SERPL-MCNC: 7.7 G/DL (ref 6.4–8.2)
RBC # BLD AUTO: 4.96 M/UL (ref 4.1–5.1)
SODIUM SERPL-SCNC: 139 MMOL/L (ref 136–145)
TIBC SERPL-MCNC: 334 UG/DL (ref 250–450)
VIT B12 SERPL-MCNC: 292 PG/ML (ref 211–911)
WBC # BLD AUTO: 4 K/UL (ref 4.5–13)

## 2019-12-10 PROCEDURE — 82728 ASSAY OF FERRITIN: CPT

## 2019-12-10 PROCEDURE — 80053 COMPREHEN METABOLIC PANEL: CPT

## 2019-12-10 PROCEDURE — 82607 VITAMIN B-12: CPT

## 2019-12-10 PROCEDURE — 85025 COMPLETE CBC W/AUTO DIFF WBC: CPT

## 2019-12-10 PROCEDURE — 83540 ASSAY OF IRON: CPT

## 2019-12-10 PROCEDURE — 36415 COLL VENOUS BLD VENIPUNCTURE: CPT

## 2019-12-10 NOTE — PROGRESS NOTES
Saint Lope is a 60 y.o. female presenting today for a follow-up appointment with no pain. Chief Complaint   Patient presents with    Thrombocytopenia    Follow-up       Is someone accompanying this pt? no    Is the patient using any DME equipment during OV? no    Visit Vitals  /71 (BP 1 Location: Right arm, BP Patient Position: Sitting)   Pulse 66   Temp 97.2 °F (36.2 °C) (Oral)   Resp 18   Ht 5' 7\" (1.702 m)   Wt 232 lb (105.2 kg)   SpO2 97%   BMI 36.34 kg/m²       Current Outpatient Medications   Medication Sig    buPROPion XL (WELLBUTRIN XL) 300 mg XL tablet     amLODIPine (NORVASC) 10 mg tablet     diphenoxylate-atropine (LOMOTIL) 2.5-0.025 mg per tablet     ergocalciferol (ERGOCALCIFEROL) 50,000 unit capsule     HYDROcodone-acetaminophen (NORCO) 5-325 mg per tablet take 1 to 2 tablets by mouth every 6 hours if needed for pain    COMBIVENT RESPIMAT  mcg/actuation inhaler     NIFEdipine ER (ADALAT CC) 60 mg ER tablet     pantoprazole (PROTONIX) 40 mg tablet     predniSONE (DELTASONE) 5 mg tablet     sertraline (ZOLOFT) 50 mg tablet Take 100 mg by mouth daily.  sildenafil, antihypertensive, (REVATIO) 20 mg tablet     traZODone (DESYREL) 50 mg tablet      No current facility-administered medications for this visit. Medications no longer taking/discontinued: none    1. Have you been to the ER, urgent care clinic since your last visit? Hospitalized since your last visit? No    2. Have you seen or consulted any other health care providers outside of the [address] [place] [address] since your last visit? Include any pap smears or colon screening. No    Fall Risk Assessment, last 12 mths 5/29/2019   Able to walk? Yes   Fall in past 12 months?  No       3 most recent PHQ Screens 12/10/2019   Little interest or pleasure in doing things Not at all   Feeling down, depressed, irritable, or hopeless Not at all   Total Score PHQ 2 0       Abuse Screening Questionnaire 12/10/2019   Do you ever feel afraid of your partner? N   Are you in a relationship with someone who physically or mentally threatens you? N   Is it safe for you to go home?  Y       Health Maintenance Due   Topic Date Due    Hepatitis C Screening  1955    DTaP/Tdap/Td series (1 - Tdap) 05/22/1968    Shingrix Vaccine Age 50> (1 of 2) 05/22/2007    BREAST CANCER SCRN MAMMOGRAM  05/22/2007    FOBT Q 1 YEAR AGE 50-75  05/22/2007    Influenza Age 9 to Adult  08/01/2019    PAP AKA CERVICAL CYTOLOGY  08/11/2019

## 2019-12-10 NOTE — PROGRESS NOTES
[facility] Progress Note    Date: December 10, 2019    Name: Berkley Petit    MRN: [id number]         : 1955    Peripheral Lab Draw      Ms. Kraft to [facility], ambulatory at [phone] accompanied by SELF. Pt was assessed and education was provided. Ms. Kraft's vitals were reviewed and patient was observed for 5 minutes prior to treatment. There were no vitals taken for this visit. Recent Results (from the past 12 hour(s))   CBC WITH 3 PART DIFF    Collection Time: 12/10/19  2:15 PM   Result Value Ref Range    WBC 4.0 (L) 4.5 - 13.0 K/uL    RBC 4.96 4.10 - 5.10 M/uL    HGB 13.6 12.0 - 16.0 g/dL    HCT 41.6 36 - 48 %    MCV 83.9 78 - 102 FL    MCH 27.4 25.0 - 35.0 PG    MCHC 32.7 31 - 37 g/dL    RDW 15.7 (H) 11.5 - 14.5 %    PLATELET 381 (L) 840 - 440 K/uL    NEUTROPHILS 63 40 - 70 %    MIXED CELLS 13 0.1 - 17 %    LYMPHOCYTES 24 14 - 44 %    ABS. NEUTROPHILS 2.5 1.8 - 9.5 K/UL    ABS. MIXED CELLS 0.5 0.0 - 2.3 K/uL    ABS. LYMPHOCYTES 1.0 (L) 1.1 - 5.9 K/UL    DF AUTOMATED         Blood obtained peripherally from LEFT ARM x 1 attempt with butterfly needle and sent to lab for Cbc w/diff, Cmp, Vitamin B 12 & Folate, Iron profile and Ferritin per written orders. No bleeding or hematoma noted at site. Gauze and coban applied. Ms. Ashley Hunt tolerated the phlebotomy, and had no complaints. Patient armband removed and shredded. Ms. Ashley Hunt was discharged from [place] [address] in stable condition at [phone].      Anjelica Fry Phlebotomist PCT  December 10, 2019  2:22 PM

## 2019-12-10 NOTE — PROGRESS NOTES
Jeffry Madrigal is a 60 y.o. 1955 female and presents with Follow-up (Thrombocytopenia and cryoglobulinemia)     Britney Adrian  Date: 12/10/19  : 1955     Jessica Stoner MD      Ms. Kraft is a 60 y. o. year old female who was seen for Thrombocytopenia.        Subjective:      Ms. Altagracia Haines is a 60y.o. year old female with a past medical history of Hep C, cryoglobulinemic vasculitis, and thrombocytopenia. She is currently under the care of a rheumatologist for her cryoglobulinemia, she was treated with Rituxan She is here today for follow up. She denies unexplained bruising/bleeding.      Labs on 2019 showed WBC 4.2, H&H 13.7/41.5, platelet 957. Patient had positive antibody with platelet associated anti-IIb/IIIa, positive anti-Ib/IX, and anti-Ia/IIa. BUN and creatinine normal at 18 and 1.05.     On review of systems today she complains of 10/10 chronic back pain, and easy fatigue, occasional nausea as well as diarrhea since she started Wellbutrin. Denies any fevers, chills, shortness of breath, abdominal pain. No melena or bright red blood per rectum. No focal neurologic deficit. ECOG performance status 0. Independent with ADLs and IADLs.    Past medical history, family history, and social history: these were reviewed and remains unchanged. History reviewed. No pertinent past medical history. History reviewed. No pertinent surgical history. Social History     Socioeconomic History    Marital status:      Spouse name: Not on file    Number of children: Not on file    Years of education: Not on file    Highest education level: Not on file   Tobacco Use    Smoking status: Never Smoker    Smokeless tobacco: Never Used   Substance and Sexual Activity    Alcohol use: Never     Frequency: Never    Drug use: Never    Sexual activity: Not Currently     History reviewed. No pertinent family history.     Current Outpatient Medications   Medication Sig Dispense Refill    buPROPion XL (WELLBUTRIN XL) 300 mg XL tablet       amLODIPine (NORVASC) 10 mg tablet       diphenoxylate-atropine (LOMOTIL) 2.5-0.025 mg per tablet   0    ergocalciferol (ERGOCALCIFEROL) 50,000 unit capsule       HYDROcodone-acetaminophen (NORCO) 5-325 mg per tablet take 1 to 2 tablets by mouth every 6 hours if needed for pain  0    COMBIVENT RESPIMAT  mcg/actuation inhaler       NIFEdipine ER (ADALAT CC) 60 mg ER tablet       pantoprazole (PROTONIX) 40 mg tablet       predniSONE (DELTASONE) 5 mg tablet       sertraline (ZOLOFT) 50 mg tablet Take 100 mg by mouth daily.  sildenafil, antihypertensive, (REVATIO) 20 mg tablet       traZODone (DESYREL) 50 mg tablet          No Known Allergies    Review of Systems  And review of systems today she denies any fevers, chills, shortness of breath, nausea vomiting or abdominal pain. No bright red blood per rectum. No melena. No bleeding. All other points of review of system have been reviewed and were negative. ECOG performance status 0. Independent with ADLs and IADLs. Objective:  Visit Vitals  /71 (BP 1 Location: Right arm, BP Patient Position: Sitting)   Pulse 66   Temp 97.2 °F (36.2 °C) (Oral)   Resp 18   Ht 5' 7\" (1.702 m)   Wt 105.2 kg (232 lb)   SpO2 97%   BMI 36.34 kg/m²     Physical Exam:   General appearance - alert, well appearing, and in no distress  Mental status - alert, oriented to person, place, and time  EYE-LANDY, EOMI  ENT-ENT exam normal, no neck nodes or sinus tenderness  Mouth - mucous membranes moist, pharynx normal without lesions  Neck - supple, no significant adenopathy   Chest - clear to auscultation, no wheezes, rales or rhonchi, symmetric air entry   Heart - normal rate and regular rhythm   Abdomen - soft, nontender, nondistended, no masses or organomegaly  Lymph- no adenopathy palpable  Ext-no pedal edema noted  Skin-Warm and dry.    Neuro -alert, oriented, normal speech, no focal findings or movement disorder noted      Diagnostic Imaging     No results found for this or any previous visit. No results found for this or any previous visit. No results found for this or any previous visit. Lab Results  Lab Results   Component Value Date/Time    WBC 4.0 (L) 12/10/2019 02:15 PM    HGB 13.6 12/10/2019 02:15 PM    HCT 41.6 12/10/2019 02:15 PM    PLATELET 763 (L) 74/78/1361 02:15 PM    MCV 83.9 12/10/2019 02:15 PM       Lab Results   Component Value Date/Time    Sodium 136 08/26/2019 11:42 AM    Potassium 4.3 08/26/2019 11:42 AM    Chloride 104 08/26/2019 11:42 AM    CO2 28 08/26/2019 11:42 AM    Anion gap 4 08/26/2019 11:42 AM    Glucose 89 08/26/2019 11:42 AM    BUN 18 08/26/2019 11:42 AM    Creatinine 1.05 08/26/2019 11:42 AM    BUN/Creatinine ratio 17 08/26/2019 11:42 AM    GFR est AA >60 08/26/2019 11:42 AM    GFR est non-AA 53 (L) 08/26/2019 11:42 AM    Calcium 8.8 08/26/2019 11:42 AM    AST (SGOT) 22 08/26/2019 11:42 AM    Alk. phosphatase 70 08/26/2019 11:42 AM    Protein, total 7.5 08/26/2019 11:42 AM    Albumin 3.5 08/26/2019 11:42 AM    Globulin 4.0 08/26/2019 11:42 AM    A-G Ratio 0.9 08/26/2019 11:42 AM    ALT (SGPT) 23 08/26/2019 11:42 AM       Assessment/Plan:  1. Thrombocytopenia (HCC)       I have explained to the patient that her thrombocytopenia is mild. No interventions are warranted at this time. She has ITP with positive anti platelets antibodies. We will monitor CBC every 3 months, if the platelets fall below 10,000 the patient will be treated with IVIG or high dose steroid. Labs today showed WBC 4.0, H&H 13.6/41.6, platelet 498. Patient is clinically doing very well.   Her platelets are holding nicely and does not need any intervention.     2. Cryoglobulinemia: F/u  with rheumatology-patient said that testing done and rheumatology office with Dr. Pricila Hernandez was negative for cryoglobulins.     RTC 3 months        Dary Becker MD

## 2019-12-11 DIAGNOSIS — D69.6 THROMBOCYTOPENIA (HCC): Primary | ICD-10-CM

## 2019-12-12 ENCOUNTER — TELEPHONE (OUTPATIENT)
Dept: ONCOLOGY | Age: 64
End: 2019-12-12

## 2019-12-13 PROBLEM — D50.9 IRON DEFICIENCY ANEMIA, UNSPECIFIED: Status: ACTIVE | Noted: 2019-12-13

## 2019-12-13 RX ORDER — DIPHENHYDRAMINE HYDROCHLORIDE 50 MG/ML
50 INJECTION, SOLUTION INTRAMUSCULAR; INTRAVENOUS AS NEEDED
Status: CANCELLED
Start: 2019-12-19

## 2019-12-13 RX ORDER — HYDROCORTISONE SODIUM SUCCINATE 100 MG/2ML
100 INJECTION, POWDER, FOR SOLUTION INTRAMUSCULAR; INTRAVENOUS AS NEEDED
Status: CANCELLED | OUTPATIENT
Start: 2019-12-19

## 2019-12-13 RX ORDER — HEPARIN 100 UNIT/ML
300-500 SYRINGE INTRAVENOUS AS NEEDED
Status: CANCELLED
Start: 2019-12-19

## 2019-12-13 RX ORDER — ACETAMINOPHEN 325 MG/1
650 TABLET ORAL AS NEEDED
Status: CANCELLED
Start: 2019-12-19

## 2019-12-13 RX ORDER — ALBUTEROL SULFATE 0.83 MG/ML
2.5 SOLUTION RESPIRATORY (INHALATION) AS NEEDED
Status: CANCELLED
Start: 2019-12-19

## 2019-12-13 RX ORDER — ONDANSETRON 2 MG/ML
8 INJECTION INTRAMUSCULAR; INTRAVENOUS AS NEEDED
Status: CANCELLED | OUTPATIENT
Start: 2019-12-19

## 2019-12-13 RX ORDER — EPINEPHRINE 1 MG/ML
0.3 INJECTION, SOLUTION, CONCENTRATE INTRAVENOUS AS NEEDED
Status: CANCELLED | OUTPATIENT
Start: 2019-12-19

## 2019-12-13 RX ORDER — SODIUM CHLORIDE 0.9 % (FLUSH) 0.9 %
10 SYRINGE (ML) INJECTION AS NEEDED
Status: CANCELLED
Start: 2019-12-19

## 2019-12-19 ENCOUNTER — HOSPITAL ENCOUNTER (OUTPATIENT)
Dept: INFUSION THERAPY | Age: 64
Discharge: HOME | End: 2019-12-19
Payer: OTHER GOVERNMENT

## 2019-12-19 VITALS
DIASTOLIC BLOOD PRESSURE: 74 MMHG | OXYGEN SATURATION: 98 % | TEMPERATURE: 97.1 F | SYSTOLIC BLOOD PRESSURE: 147 MMHG | HEART RATE: 59 BPM | RESPIRATION RATE: 18 BRPM

## 2019-12-19 DIAGNOSIS — D50.9 IRON DEFICIENCY ANEMIA, UNSPECIFIED IRON DEFICIENCY ANEMIA TYPE: ICD-10-CM

## 2019-12-19 DIAGNOSIS — D69.6 THROMBOCYTOPENIA (HCC): Primary | ICD-10-CM

## 2019-12-19 PROCEDURE — 96365 THER/PROPH/DIAG IV INF INIT: CPT

## 2019-12-19 PROCEDURE — 74011000250 HC RX REV CODE- 250: Performed by: INTERNAL MEDICINE

## 2019-12-19 PROCEDURE — 74011250636 HC RX REV CODE- 250/636: Performed by: INTERNAL MEDICINE

## 2019-12-19 RX ORDER — SODIUM CHLORIDE 9 MG/ML
10 INJECTION INTRAMUSCULAR; INTRAVENOUS; SUBCUTANEOUS AS NEEDED
Status: CANCELLED | OUTPATIENT
Start: 2019-12-26

## 2019-12-19 RX ORDER — SODIUM CHLORIDE 0.9 % (FLUSH) 0.9 %
10 SYRINGE (ML) INJECTION AS NEEDED
Status: CANCELLED
Start: 2019-12-26

## 2019-12-19 RX ORDER — ALBUTEROL SULFATE 0.83 MG/ML
2.5 SOLUTION RESPIRATORY (INHALATION) AS NEEDED
Status: CANCELLED
Start: 2019-12-26

## 2019-12-19 RX ORDER — SODIUM CHLORIDE 9 MG/ML
25 INJECTION, SOLUTION INTRAVENOUS CONTINUOUS
Status: CANCELLED | OUTPATIENT
Start: 2019-12-26

## 2019-12-19 RX ORDER — DIPHENHYDRAMINE HYDROCHLORIDE 50 MG/ML
50 INJECTION, SOLUTION INTRAMUSCULAR; INTRAVENOUS AS NEEDED
Status: CANCELLED
Start: 2019-12-26

## 2019-12-19 RX ORDER — HEPARIN 100 UNIT/ML
300-500 SYRINGE INTRAVENOUS AS NEEDED
Status: CANCELLED
Start: 2019-12-26

## 2019-12-19 RX ORDER — SODIUM CHLORIDE 9 MG/ML
10 INJECTION INTRAMUSCULAR; INTRAVENOUS; SUBCUTANEOUS AS NEEDED
Status: ACTIVE | OUTPATIENT
Start: 2019-12-19 | End: 2019-12-19

## 2019-12-19 RX ORDER — ACETAMINOPHEN 325 MG/1
650 TABLET ORAL AS NEEDED
Status: CANCELLED
Start: 2019-12-26

## 2019-12-19 RX ORDER — ONDANSETRON 2 MG/ML
8 INJECTION INTRAMUSCULAR; INTRAVENOUS AS NEEDED
Status: CANCELLED | OUTPATIENT
Start: 2019-12-26

## 2019-12-19 RX ORDER — HYDROCORTISONE SODIUM SUCCINATE 100 MG/2ML
100 INJECTION, POWDER, FOR SOLUTION INTRAMUSCULAR; INTRAVENOUS AS NEEDED
Status: CANCELLED | OUTPATIENT
Start: 2019-12-26

## 2019-12-19 RX ORDER — SODIUM CHLORIDE 9 MG/ML
25 INJECTION, SOLUTION INTRAVENOUS CONTINUOUS
Status: DISPENSED | OUTPATIENT
Start: 2019-12-19 | End: 2019-12-19

## 2019-12-19 RX ORDER — EPINEPHRINE 1 MG/ML
0.3 INJECTION, SOLUTION, CONCENTRATE INTRAVENOUS AS NEEDED
Status: CANCELLED | OUTPATIENT
Start: 2019-12-26

## 2019-12-19 RX ADMIN — SODIUM CHLORIDE 25 ML/HR: 900 INJECTION, SOLUTION INTRAVENOUS at 10:58

## 2019-12-19 RX ADMIN — SODIUM CHLORIDE 10 ML: 9 INJECTION INTRAMUSCULAR; INTRAVENOUS; SUBCUTANEOUS at 11:20

## 2019-12-19 RX ADMIN — FERRIC CARBOXYMALTOSE INJECTION 750 MG: 50 INJECTION, SOLUTION INTRAVENOUS at 10:58

## 2019-12-19 NOTE — PROGRESS NOTES
[address] [facility] [place] Progress Note    Date: 2019    Name: Sandra Johnson    MRN: [id number]         : 1955    Injectafer Infusion 1 of 2    Ms. Kraft to [facility], at 1030. Pt was assessed and education was provided. Ms. Kraft's vitals were reviewed and patient was observed for 5 minutes prior to treatment. Visit Vitals  /74 (BP 1 Location: Right arm, BP Patient Position: Sitting)   Pulse (!) 59   Temp 97.1 °F (36.2 °C)   Resp 18   SpO2 98%   Breastfeeding No         24g PIV placed in right forearm x1 attempt. PIV flushed easily and had brisk blood return. Injectafer 750mg/250mL of NS infused over 20 minutes per order. Ms. Sara Mack tolerated the infusion, and had no complaints. VS remained stable. PIV flushed with NS 10 ml and removed. No bleeding or hematoma noted at site. Linnette and coban applied. Reviewed discharge instructions with patient, including expected side effects and signs of allergic reaction requiring medical attention (itching/hives/rashes, SOB, chest pain, lip/tongue/facial swelling). Patient given printed copy to take home. Patient verbalized understanding of discharge instructions. Patient armband removed and shredded. Ms. Sara Mack was discharged from [place] [address] in stable condition at 1230.  She is to return on 19 at 1100 for Injectafer infusion 2 of 2.    Dwight Salguero RN  2019  1230

## 2019-12-23 ENCOUNTER — TELEPHONE (OUTPATIENT)
Dept: ONCOLOGY | Age: 64
End: 2019-12-23

## 2019-12-23 NOTE — TELEPHONE ENCOUNTER
Patient left vm that she isn't feeling very well. She has a headache and is very tired. Stated she had an infusion last week and wasn't sure if this was a side effect. She can be reached at [phone].

## 2019-12-24 NOTE — TELEPHONE ENCOUNTER
Left vm for patient regarding noted symptoms, advised patient these are not side effects of her Injectafer infusion from 12/19/19 and she should follow up with her PCP.

## 2019-12-26 ENCOUNTER — TELEPHONE (OUTPATIENT)
Dept: ONCOLOGY | Age: 64
End: 2019-12-26

## 2019-12-26 DIAGNOSIS — D50.9 IRON DEFICIENCY ANEMIA, UNSPECIFIED IRON DEFICIENCY ANEMIA TYPE: ICD-10-CM

## 2019-12-26 NOTE — TELEPHONE ENCOUNTER
Spoke with patient regarding her symptoms. She states she has \"been feverish on and off for a few days, it was 101 last night and I took Tylenol. It's 99.2 now\", feels very weak and fatigued with \"a few body aches\". Patient reports she had a UTI \"2 days before the infusion\" and was prescribed Macrobid by PCP which she has completed. Reassured patient that these symptoms are not from the Injectafer infusion and she should follow up with her PCP. She verbalized understanding and had no other questions/concerns.

## 2019-12-26 NOTE — TELEPHONE ENCOUNTER
Patient's  called stating patient has felt very weak, feverish and extremely fatigue since iron infusion last week. Stated patient was on antibiotics for UTI and wanted to know if that could possibly have caused and interaction with infusion causing patient to feel ill.  Also stated patient's symptoms have worsened since getting the infusi

## 2019-12-27 ENCOUNTER — HOSPITAL ENCOUNTER (OUTPATIENT)
Dept: INFUSION THERAPY | Age: 64
Discharge: HOME | End: 2019-12-27
Payer: OTHER GOVERNMENT

## 2019-12-27 VITALS
RESPIRATION RATE: 20 BRPM | SYSTOLIC BLOOD PRESSURE: 135 MMHG | DIASTOLIC BLOOD PRESSURE: 63 MMHG | OXYGEN SATURATION: 99 % | TEMPERATURE: 97 F | HEART RATE: 69 BPM

## 2019-12-27 DIAGNOSIS — D50.9 IRON DEFICIENCY ANEMIA, UNSPECIFIED IRON DEFICIENCY ANEMIA TYPE: Primary | ICD-10-CM

## 2019-12-27 PROCEDURE — 74011250636 HC RX REV CODE- 250/636: Performed by: INTERNAL MEDICINE

## 2019-12-27 PROCEDURE — 96365 THER/PROPH/DIAG IV INF INIT: CPT

## 2019-12-27 RX ORDER — SODIUM CHLORIDE 9 MG/ML
25 INJECTION, SOLUTION INTRAVENOUS CONTINUOUS
Status: DISPENSED | OUTPATIENT
Start: 2019-12-27 | End: 2019-12-27

## 2019-12-27 RX ORDER — ALBUTEROL SULFATE 0.83 MG/ML
2.5 SOLUTION RESPIRATORY (INHALATION) AS NEEDED
Status: CANCELLED
Start: 2020-01-02

## 2019-12-27 RX ORDER — SODIUM CHLORIDE 0.9 % (FLUSH) 0.9 %
10 SYRINGE (ML) INJECTION AS NEEDED
Status: CANCELLED
Start: 2020-01-02

## 2019-12-27 RX ORDER — ACETAMINOPHEN 325 MG/1
650 TABLET ORAL AS NEEDED
Status: CANCELLED
Start: 2020-01-02

## 2019-12-27 RX ORDER — SODIUM CHLORIDE 9 MG/ML
25 INJECTION, SOLUTION INTRAVENOUS CONTINUOUS
Status: CANCELLED | OUTPATIENT
Start: 2020-01-02

## 2019-12-27 RX ORDER — ONDANSETRON 2 MG/ML
8 INJECTION INTRAMUSCULAR; INTRAVENOUS AS NEEDED
Status: CANCELLED | OUTPATIENT
Start: 2020-01-02

## 2019-12-27 RX ORDER — EPINEPHRINE 1 MG/ML
0.3 INJECTION, SOLUTION, CONCENTRATE INTRAVENOUS AS NEEDED
Status: CANCELLED | OUTPATIENT
Start: 2020-01-02

## 2019-12-27 RX ORDER — HEPARIN 100 UNIT/ML
300-500 SYRINGE INTRAVENOUS AS NEEDED
Status: CANCELLED
Start: 2020-01-02

## 2019-12-27 RX ORDER — ESCITALOPRAM OXALATE 20 MG/1
20 TABLET ORAL DAILY
COMMUNITY

## 2019-12-27 RX ORDER — DIPHENHYDRAMINE HYDROCHLORIDE 50 MG/ML
50 INJECTION, SOLUTION INTRAMUSCULAR; INTRAVENOUS AS NEEDED
Status: CANCELLED
Start: 2020-01-02

## 2019-12-27 RX ORDER — SODIUM CHLORIDE 0.9 % (FLUSH) 0.9 %
10 SYRINGE (ML) INJECTION AS NEEDED
Status: DISPENSED | OUTPATIENT
Start: 2019-12-27 | End: 2019-12-27

## 2019-12-27 RX ORDER — HYDROCORTISONE SODIUM SUCCINATE 100 MG/2ML
100 INJECTION, POWDER, FOR SOLUTION INTRAMUSCULAR; INTRAVENOUS AS NEEDED
Status: CANCELLED | OUTPATIENT
Start: 2020-01-02

## 2019-12-27 RX ORDER — SODIUM CHLORIDE 9 MG/ML
10 INJECTION INTRAMUSCULAR; INTRAVENOUS; SUBCUTANEOUS AS NEEDED
Status: CANCELLED | OUTPATIENT
Start: 2020-01-02

## 2019-12-27 RX ADMIN — Medication 10 ML: at 10:18

## 2019-12-27 RX ADMIN — SODIUM CHLORIDE 25 ML/HR: 9 INJECTION, SOLUTION INTRAVENOUS at 10:19

## 2019-12-27 RX ADMIN — FERRIC CARBOXYMALTOSE INJECTION 750 MG: 50 INJECTION, SOLUTION INTRAVENOUS at 10:19

## 2019-12-27 NOTE — PROGRESS NOTES
[facility] Progress Note    Date: 2019    Name: Lanre Bryant    MRN: [id number]         : 1955    Injectafer Infusion 2 of 2    Ms. Kraft to [facility], ambulatory, at 0945. Pt was assessed and education was provided. Ms. Kraft's vitals were reviewed and patient was observed for 5 minutes prior to treatment. Visit Vitals  /63 (BP 1 Location: Right arm, BP Patient Position: At rest)   Pulse 69   Temp 97 °F (36.1 °C)   Resp 20   SpO2 99%         22g PIV placed in right forearm x1 attempt. PIV flushed easily and had brisk blood return. Injectafer 750mg/250mL of NS infused over 20 minutes per order. Ms. Carmenza Rocha tolerated the infusion, and had no complaints. VS remained stable. PIV flushed with NS 10 ml and removed. No bleeding or hematoma noted at site. Guaze and coban applied. Reviewed discharge instructions with patient, including expected side effects and signs of allergic reaction requiring medical attention (itching/hives/rashes, SOB, chest pain, lip/tongue/facial swelling). Patient given printed copy to take home. Patient verbalized understanding of discharge instructions. Patient armband removed and shredded. Ms. Carmenza Rocha was discharged from [place] [address] in stable condition at 1100. She has no further appointments here at [facility].     Onesimo Gaytan RN  2019

## 2020-01-21 ENCOUNTER — TELEPHONE (OUTPATIENT)
Dept: ONCOLOGY | Age: 65
End: 2020-01-21

## 2020-07-10 ENCOUNTER — TELEPHONE (OUTPATIENT)
Dept: ONCOLOGY | Age: 65
End: 2020-07-10

## 2020-07-10 NOTE — TELEPHONE ENCOUNTER
Left message on voicemail for patient to call office and reschedule appointment for 7/13 to a virtual visit after lab work is complete.

## 2020-07-13 ENCOUNTER — TELEPHONE (OUTPATIENT)
Dept: ONCOLOGY | Age: 65
End: 2020-07-13

## 2020-07-13 ENCOUNTER — HOSPITAL ENCOUNTER (OUTPATIENT)
Dept: INFUSION THERAPY | Age: 65
Discharge: HOME | End: 2020-07-13
Payer: OTHER GOVERNMENT

## 2020-07-13 VITALS
OXYGEN SATURATION: 99 % | DIASTOLIC BLOOD PRESSURE: 78 MMHG | SYSTOLIC BLOOD PRESSURE: 144 MMHG | HEART RATE: 70 BPM | TEMPERATURE: 98.3 F

## 2020-07-13 DIAGNOSIS — D69.6 THROMBOCYTOPENIA (HCC): ICD-10-CM

## 2020-07-13 LAB
BASO+EOS+MONOS # BLD AUTO: 0.3 K/UL (ref 0–2.3)
BASO+EOS+MONOS NFR BLD AUTO: 10 % (ref 0.1–17)
DIFFERENTIAL METHOD BLD: ABNORMAL
ERYTHROCYTE [DISTWIDTH] IN BLOOD BY AUTOMATED COUNT: 13.2 % (ref 11.5–14.5)
FERRITIN SERPL-MCNC: 185 NG/ML (ref 8–388)
HCT VFR BLD AUTO: 44.7 % (ref 36–48)
HGB BLD-MCNC: 15.2 G/DL (ref 12–16)
IRON SATN MFR SERPL: 36 % (ref 20–50)
IRON SERPL-MCNC: 69 UG/DL (ref 50–175)
LYMPHOCYTES # BLD: 0.6 K/UL (ref 1.1–5.9)
LYMPHOCYTES NFR BLD: 18 % (ref 14–44)
MCH RBC QN AUTO: 30.6 PG (ref 25–35)
MCHC RBC AUTO-ENTMCNC: 34 G/DL (ref 31–37)
MCV RBC AUTO: 90.1 FL (ref 78–102)
NEUTS SEG # BLD: 2.3 K/UL (ref 1.8–9.5)
NEUTS SEG NFR BLD: 72 % (ref 40–70)
PLATELET # BLD AUTO: 92 K/UL (ref 140–440)
RBC # BLD AUTO: 4.96 M/UL (ref 4.1–5.1)
TIBC SERPL-MCNC: 194 UG/DL (ref 250–450)
WBC # BLD AUTO: 3.2 K/UL (ref 4.5–13)

## 2020-07-13 PROCEDURE — 83540 ASSAY OF IRON: CPT

## 2020-07-13 PROCEDURE — 36415 COLL VENOUS BLD VENIPUNCTURE: CPT

## 2020-07-13 PROCEDURE — 85025 COMPLETE CBC W/AUTO DIFF WBC: CPT

## 2020-07-13 PROCEDURE — 82728 ASSAY OF FERRITIN: CPT

## 2020-07-13 NOTE — PROGRESS NOTES
[facility] Progress Note    Date: 2020    Name: Sandra Johnson    MRN: [id number]         : 1955    Peripheral Lab Draw      Ms. Kraft to [facility], ambulatory at [phone] accompanied by self. Pt was assessed and education was provided. Ms. Kraft's vitals were reviewed and patient was observed for 5 minutes prior to treatment. Visit Vitals  /78 (BP 1 Location: Left arm, BP Patient Position: Sitting)   Pulse 70   Temp 98.3 °F (36.8 °C) (Oral)   SpO2 99%     Recent Results (from the past 12 hour(s))   CBC WITH 3 PART DIFF    Collection Time: 20  2:30 PM   Result Value Ref Range    WBC 3.2 (L) 4.5 - 13.0 K/uL    RBC 4.96 4.10 - 5.10 M/uL    HGB 15.2 12.0 - 16.0 g/dL    HCT 44.7 36 - 48 %    MCV 90.1 78 - 102 FL    MCH 30.6 25.0 - 35.0 PG    MCHC 34.0 31 - 37 g/dL    RDW 13.2 11.5 - 14.5 %    PLATELET 92 (L) 970 - 440 K/uL    NEUTROPHILS 72 (H) 40 - 70 %    MIXED CELLS 10 0.1 - 17 %    LYMPHOCYTES 18 14 - 44 %    ABS. NEUTROPHILS 2.3 1.8 - 9.5 K/UL    ABS. MIXED CELLS 0.3 0.0 - 2.3 K/uL    ABS. LYMPHOCYTES 0.6 (L) 1.1 - 5.9 K/UL    DF AUTOMATED           Blood obtained peripherally from left arm x 1 attempt with butterfly needle and sent to lab for Cbc w/diff, Iron Profile and Ferritin per written orders. No bleeding or hematoma noted at site. Gauze and coban applied. Ms. Sara Mack tolerated the phlebotomy, and had no complaints. Patient armband removed and shredded. Ms. Sara Mack was discharged from [place] [address] in stable condition at 1430.      Josepha Leyden Phlebotomist PCT  2020  3:21 PM

## 2020-07-16 ENCOUNTER — TELEPHONE (OUTPATIENT)
Dept: ONCOLOGY | Age: 65
End: 2020-07-16

## 2020-07-17 ENCOUNTER — VIRTUAL VISIT (OUTPATIENT)
Dept: ONCOLOGY | Age: 65
End: 2020-07-17

## 2020-07-17 DIAGNOSIS — D69.6 THROMBOCYTOPENIA (HCC): Primary | ICD-10-CM

## 2020-07-17 DIAGNOSIS — D89.1 CRYOGLOBULINEMIC VASCULITIS (HCC): ICD-10-CM

## 2020-07-17 NOTE — PROGRESS NOTES
Antonio Ortega is a 63 y.o. female presenting today for a follow-up appointment Via Virtual Visit. Verified 2 patient identifiers. Patient denies any complaints. Chief Complaint   Patient presents with    Thrombocytopenia       Current Outpatient Medications   Medication Sig    escitalopram oxalate (LEXAPRO) 20 mg tablet Take 20 mg by mouth daily.  buPROPion XL (WELLBUTRIN XL) 300 mg XL tablet     amLODIPine (NORVASC) 10 mg tablet     diphenoxylate-atropine (LOMOTIL) 2.5-0.025 mg per tablet     ergocalciferol (ERGOCALCIFEROL) 50,000 unit capsule     HYDROcodone-acetaminophen (NORCO) 5-325 mg per tablet take 1 to 2 tablets by mouth every 6 hours if needed for pain    COMBIVENT RESPIMAT  mcg/actuation inhaler     NIFEdipine ER (ADALAT CC) 60 mg ER tablet     pantoprazole (PROTONIX) 40 mg tablet     traZODone (DESYREL) 50 mg tablet      No current facility-administered medications for this visit. Fall Risk Assessment, last 12 mths 5/29/2019   Able to walk? Yes   Fall in past 12 months? No       3 most recent PHQ Screens 12/10/2019   Little interest or pleasure in doing things Not at all   Feeling down, depressed, irritable, or hopeless Not at all   Total Score PHQ 2 0       Abuse Screening Questionnaire 12/10/2019   Do you ever feel afraid of your partner? N   Are you in a relationship with someone who physically or mentally threatens you? N   Is it safe for you to go home? Y       1. Have you been to the ER, urgent care clinic since your last visit? Hospitalized since your last visit? No    2. Have you seen or consulted any other health care providers outside of the [address] [place] [address] since your last visit? Include any pap smears or colon screening.  No

## 2020-07-17 NOTE — PROGRESS NOTES
Lucie Goodwin is a 63 y.o. female who was seen by synchronous (real-time) audio- technology on 7/17/2020. Traci Barber  Date: 7/17/20  JGX: 8/92/5688     Flavia Breen MD      Ms. Kraft is a 60 y. o. year old female who was seen for Thrombocytopenia. On surveillance         Assessment & Plan:   Diagnoses and all orders for this visit:    1. Thrombocytopenia ([facility]    2. Cryoglobulinemic vasculitis ([facility]        The complexity of medical decision making for this visit is moderate       1. Thrombocytopenia (HCC)       I have explained to the patient that her thrombocytopenia is mild. No interventions are warranted at this time. She has ITP with positive anti platelets antibodies.  Labs on 7/13/2020 showed WBC 3.2, H&H 15.2/44.7, MCV 90.1, platelets 13,365. ALC 0.6 (1.15.9). Transferrin saturation 36%, ferritin 185. Patient denies any hemoptysis, epistaxis, melena or bright red blood per rectum. She has no form of bleeding. I instructed her to avoid NSAIDs such as ibuprofen, naproxen, Aleve etc. and aspirin. Patient also instructed to go to the ER immediately if she has any bleeding or dotted rash on her body or extremities. 2. Cryoglobulinemia: F/u  with rheumatology-patient said that testing done and rheumatology office with Dr. aDne Obregon was negative for cryoglobulins.     RTC 3 months    I spent at least 15 minutes on this visit with this established patient. 712  Subjective:   Ms. Vinay Brock is a 60y.o. year old female with a past medical history of Hep C, cryoglobulinemic vasculitis, and thrombocytopenia. She is currently under the care of a rheumatologist for her cryoglobulinemia, she was treated with Rituxan She is here today for follow up. She denies unexplained bruising/bleeding.      Labs on 8/26/2019 showed WBC 4.2, H&H 13.7/41.5, platelet 214.  Patient had positive antibody with platelet associated anti-IIb/IIIa, positive anti-Ib/IX, and anti-Ia/IIa.   BUN and creatinine normal at 18 and 1.05.     On review of systems today she complains of 10/10 chronic back pain, and easy fatigue, occasional nausea as well as diarrhea since she started Wellbutrin.  Denies any fevers, chills, shortness of breath, abdominal pain.  No melena or bright red blood per rectum.  No focal neurologic deficit.  ECOG performance status 0.  Independent with ADLs and IADLs.    Past medical history, family history, and social history: these were reviewed and remains unchanged.          Prior to Admission medications    Medication Sig Start Date End Date Taking? Authorizing Provider   escitalopram oxalate (LEXAPRO) 20 mg tablet Take 20 mg by mouth daily.     Provider, Historical   buPROPion XL (WELLBUTRIN XL) 300 mg XL tablet  8/12/19   Provider, Historical   amLODIPine (NORVASC) 10 mg tablet  2/9/19   Provider, Historical   diphenoxylate-atropine (LOMOTIL) 2.5-0.025 mg per tablet  4/29/19   Provider, Historical   ergocalciferol (ERGOCALCIFEROL) 50,000 unit capsule  3/8/19   Provider, Historical   HYDROcodone-acetaminophen (NORCO) 5-325 mg per tablet take 1 to 2 tablets by mouth every 6 hours if needed for pain 4/24/19   Provider, Historical   COMBIVENT RESPIMAT  mcg/actuation inhaler  3/24/19   Provider, Historical   NIFEdipine ER (ADALAT CC) 60 mg ER tablet  3/5/19   Provider, Historical   pantoprazole (PROTONIX) 40 mg tablet  2/17/19   Provider, Historical   traZODone (DESYREL) 50 mg tablet  4/4/19   Provider, Historical     Patient Active Problem List   Diagnosis Code    Thrombocytopenia ([address] D69.6    Cryoglobulinemic vasculitis ([address] D89.1    Severe obesity ([address] E66.01    Iron deficiency anemia, unspecified D50.9     Patient Active Problem List    Diagnosis Date Noted    Iron deficiency anemia, unspecified 12/13/2019    Severe obesity ([address] 12/10/2019    Thrombocytopenia ([address] 05/13/2019    Cryoglobulinemic vasculitis ([facility] 05/13/2019     Current Outpatient Medications   Medication Sig Dispense Refill    escitalopram oxalate (LEXAPRO) 20 mg tablet Take 20 mg by mouth daily.  buPROPion XL (WELLBUTRIN XL) 300 mg XL tablet       amLODIPine (NORVASC) 10 mg tablet       diphenoxylate-atropine (LOMOTIL) 2.5-0.025 mg per tablet   0    ergocalciferol (ERGOCALCIFEROL) 50,000 unit capsule       HYDROcodone-acetaminophen (NORCO) 5-325 mg per tablet take 1 to 2 tablets by mouth every 6 hours if needed for pain  0    COMBIVENT RESPIMAT  mcg/actuation inhaler       NIFEdipine ER (ADALAT CC) 60 mg ER tablet       pantoprazole (PROTONIX) 40 mg tablet       traZODone (DESYREL) 50 mg tablet        Allergies   Allergen Reactions    Tetracyclines Diarrhea and Nausea and Vomiting    Tramadol Rash and Swelling     Facial Swelling and Rash     Past Medical History:   Diagnosis Date    Raynaud's disease 12/18/2018     No past surgical history on file. No family history on file. Social History     Tobacco Use    Smoking status: Never Smoker    Smokeless tobacco: Never Used   Substance Use Topics    Alcohol use: Never     Frequency: Never         Objective:   No flowsheet data found. Additional exam findings: We discussed the expected course, resolution and complications of the diagnosis(es) in detail. Medication risks, benefits, costs, interactions, and alternatives were discussed as indicated. I advised her to contact the office if her condition worsens, changes or fails to improve as anticipated. She expressed understanding with the diagnosis(es) and plan. Antonio Jerome, who was evaluated through a patient-initiated, synchronous (real-time) audio- encounter, and/or her healthcare decision maker, is aware that it is a billable service, with coverage as determined by her insurance carrier. She provided verbal consent to proceed: Yes, and patient identification was verified.  It was conducted pursuant to the emergency declaration under the [address] [facility] [place], [address] waiver authority and the [employer] General Act. A caregiver was present when appropriate. Ability to conduct physical exam was limited. I was at home. The patient was at home.       Beatriz Marinelli MD